# Patient Record
Sex: MALE | Race: WHITE | NOT HISPANIC OR LATINO | Employment: FULL TIME | URBAN - METROPOLITAN AREA
[De-identification: names, ages, dates, MRNs, and addresses within clinical notes are randomized per-mention and may not be internally consistent; named-entity substitution may affect disease eponyms.]

---

## 2023-12-08 ENCOUNTER — OFFICE VISIT (OUTPATIENT)
Dept: PRIMARY CARE | Facility: CLINIC | Age: 63
End: 2023-12-08
Payer: COMMERCIAL

## 2023-12-08 VITALS
HEART RATE: 91 BPM | TEMPERATURE: 98 F | HEIGHT: 67 IN | BODY MASS INDEX: 29.51 KG/M2 | OXYGEN SATURATION: 97 % | DIASTOLIC BLOOD PRESSURE: 82 MMHG | WEIGHT: 188 LBS | SYSTOLIC BLOOD PRESSURE: 142 MMHG

## 2023-12-08 DIAGNOSIS — E55.9 VITAMIN D DEFICIENCY: ICD-10-CM

## 2023-12-08 DIAGNOSIS — Z12.5 PROSTATE CANCER SCREENING: ICD-10-CM

## 2023-12-08 DIAGNOSIS — Z12.11 COLON CANCER SCREENING: ICD-10-CM

## 2023-12-08 DIAGNOSIS — E11.9 TYPE 2 DIABETES MELLITUS WITHOUT COMPLICATION, WITHOUT LONG-TERM CURRENT USE OF INSULIN (MULTI): Primary | ICD-10-CM

## 2023-12-08 LAB — POC HEMOGLOBIN A1C: 6.3 % (ref 4.2–6.5)

## 2023-12-08 PROCEDURE — 83036 HEMOGLOBIN GLYCOSYLATED A1C: CPT | Performed by: FAMILY MEDICINE

## 2023-12-08 PROCEDURE — 3077F SYST BP >= 140 MM HG: CPT | Performed by: FAMILY MEDICINE

## 2023-12-08 PROCEDURE — 1036F TOBACCO NON-USER: CPT | Performed by: FAMILY MEDICINE

## 2023-12-08 PROCEDURE — 99214 OFFICE O/P EST MOD 30 MIN: CPT | Performed by: FAMILY MEDICINE

## 2023-12-08 PROCEDURE — 3079F DIAST BP 80-89 MM HG: CPT | Performed by: FAMILY MEDICINE

## 2023-12-08 RX ORDER — METFORMIN HYDROCHLORIDE 500 MG/1
1000 TABLET ORAL
Qty: 360 TABLET | Refills: 1 | Status: SHIPPED | OUTPATIENT
Start: 2023-12-08 | End: 2024-06-04 | Stop reason: SDUPTHER

## 2023-12-08 RX ORDER — METFORMIN HYDROCHLORIDE 500 MG/1
1000 TABLET ORAL
COMMUNITY
Start: 2023-02-06 | End: 2023-12-08 | Stop reason: SDUPTHER

## 2023-12-08 RX ORDER — MULTIVIT,TX WITH IRON,MINERALS
250 TABLET, EXTENDED RELEASE ORAL
COMMUNITY
Start: 2023-08-21

## 2023-12-08 RX ORDER — ACETAMINOPHEN 500 MG
TABLET ORAL DAILY
COMMUNITY

## 2023-12-08 ASSESSMENT — PAIN SCALES - GENERAL: PAINLEVEL: 0-NO PAIN

## 2023-12-08 ASSESSMENT — PATIENT HEALTH QUESTIONNAIRE - PHQ9
2. FEELING DOWN, DEPRESSED OR HOPELESS: NOT AT ALL
1. LITTLE INTEREST OR PLEASURE IN DOING THINGS: NOT AT ALL
SUM OF ALL RESPONSES TO PHQ9 QUESTIONS 1 AND 2: 0

## 2023-12-08 ASSESSMENT — LIFESTYLE VARIABLES: TOTAL SCORE: 0

## 2023-12-08 NOTE — PROGRESS NOTES
Outpatient Visit Note    Chief Complaint   Patient presents with    New Patient Visit    Med Refill         HPI:  Meet South is a 63 y.o. male who presents the office as a new patient to establish care and for medication follow-up.    Chart review notes a past medical history significant for diabetes and vitamin D deficiency.  Was previously established with Dr. Berenice Nugent, having last been seen on 8/21/2023 for diabetic follow-up.  At that time orders were given for blood work including A1c as well as urine albumin.  No results are visible in system.    Diabetes:  Has most recently managed with max dose metformin.  Denies any adverse side effects with medication.  No reported polyuria, polydipsia, polyphasia or acute vision/sensation changes.  Patient was previously offered retina view at prior PCPs office and declined.  Denies any recent diabetic eye exam.    Has historically managed his sugars with high activity level, though he had acute aggravation to his chronic knee issues playing [employer] ball several months ago which has taken him off his activity baseline. Is hoping to get back on track. Has been seen by orthopedics regarding knees in the past to which surgery was encouraged.    Current Medications  Current Outpatient Medications   Medication Instructions    cholecalciferol (Vitamin D3) 5,000 Units tablet oral, Daily    magnesium gluconate 12.5 mg magne- sium (250 mg) tablet 250 mg, oral    metFORMIN (GLUCOPHAGE) 1,000 mg, oral, 2 times daily with meals    NON FORMULARY ( 5,000 mcg ), SL, daily, 0 Refill(s), Type: Maintenance        Allergies  No Known Allergies     Past Medical History:   Diagnosis Date    Diabetes mellitus (CMS/[facility])       History reviewed. No pertinent surgical history.  Family History   Problem Relation Name Age of Onset    Breast cancer Mother       Social History     Tobacco Use    Smoking status: Never    Smokeless tobacco: Never   Vaping Use    Vaping Use: Never used    Substance Use Topics    Alcohol use: Yes     Comment: on weekends    Drug use: Never       ROS  All pertinent positive symptoms are included in the history of present illness.  All other systems have been reviewed and are negative and noncontributory to this patient's current ailments.    VITAL SIGNS  Vitals:    12/08/23 1438   BP: 142/82   Pulse: 91   Temp: 36.7 °C (98 °F)   SpO2: 97%       PHYSICAL EXAM  GENERAL APPEARANCE: alert and oriented, Pleasant and cooperative, No Acute Distress  HEENT: EOMI, PERRLA, MMM  HEART: RRR, normal S1S2, no murmurs, click or rubs  LUNGS: clear to auscultation bilaterally, no wheezes/rhonchi/rales  EXTREMITIES: no edema, normal ROM  DM FOOT: Vascular intact, Skin intact, Monofilament sensation normal  SKIN: normal, no rash, unremarkable  NEUROLOGIC EXAM: non-focal exam  MUSCULOSKELETAL: no gross abnormalities  PSYCH: affect is normal, eye contact is good    Assessment/Plan   Problem List Items Addressed This Visit             ICD-10-CM    Diabetes mellitus, type 2 (CMS/[facility]) - Primary E11.9     - A1c sugar average performed in office today which was 6.3% which continues to show good control though above your typical average has her activity level has been off its baseline  -Continue to focus on healthy, low-fat diet along with moderation of carbohydrates  -Annual diabetic eye exam advocated         Relevant Medications    metFORMIN (Glucophage) 500 mg tablet    Other Relevant Orders    POCT glycosylated hemoglobin (Hb A1C) manually resulted    Comprehensive metabolic panel    Lipid panel    Tsh With Reflex To Free T4 If Abnormal    Vitamin D deficiency E55.9     - Will check vitamin D level secondary to history of deficiency         Relevant Orders    Vitamin D 25-Hydroxy,Total (for eval of Vitamin D levels)     Other Visit Diagnoses         Codes    Prostate cancer screening     Z12.5    Relevant Orders    Prostate Spec.Ag,Screen    Colon cancer screening     Z12.11    Relevant  Medications    metFORMIN (Glucophage) 500 mg tablet    Other Relevant Orders    Cologuard® colon cancer screening

## 2023-12-08 NOTE — PATIENT INSTRUCTIONS
Problem List Items Addressed This Visit             ICD-10-CM    Diabetes mellitus, type 2 (CMS/[facility]) - Primary E11.9     - A1c sugar average performed in office today which was 6.3% which continues to show good control though above your typical average has her activity level has been off its baseline  -Continue to focus on healthy, low-fat diet along with moderation of carbohydrates  -Annual diabetic eye exam advocated         Relevant Medications    metFORMIN (Glucophage) 500 mg tablet    Other Relevant Orders    POCT glycosylated hemoglobin (Hb A1C) manually resulted    Comprehensive metabolic panel    Lipid panel    Tsh With Reflex To Free T4 If Abnormal    Vitamin D deficiency E55.9     - Will check vitamin D level secondary to history of deficiency         Relevant Orders    Vitamin D 25-Hydroxy,Total (for eval of Vitamin D levels)     Other Visit Diagnoses         Codes    Prostate cancer screening     Z12.5    Relevant Orders    Prostate Spec.Ag,Screen    Colon cancer screening     Z12.11    Relevant Medications    metFORMIN (Glucophage) 500 mg tablet    Other Relevant Orders    Cologuard® colon cancer screening

## 2023-12-08 NOTE — ASSESSMENT & PLAN NOTE
- A1c sugar average performed in office today which was 6.3% which continues to show good control though above your typical average has her activity level has been off its baseline  -Continue to focus on healthy, low-fat diet along with moderation of carbohydrates  -Annual diabetic eye exam advocated

## 2024-01-18 DIAGNOSIS — R19.5 POSITIVE COLORECTAL CANCER SCREENING USING COLOGUARD TEST: Primary | ICD-10-CM

## 2024-01-18 LAB — NONINV COLON CA DNA+OCC BLD SCRN STL QL: POSITIVE

## 2024-06-04 ENCOUNTER — OFFICE VISIT (OUTPATIENT)
Dept: PRIMARY CARE | Facility: CLINIC | Age: 64
End: 2024-06-04
Payer: COMMERCIAL

## 2024-06-04 VITALS
HEIGHT: 67 IN | HEART RATE: 100 BPM | OXYGEN SATURATION: 98 % | DIASTOLIC BLOOD PRESSURE: 80 MMHG | TEMPERATURE: 96.8 F | BODY MASS INDEX: 28.35 KG/M2 | SYSTOLIC BLOOD PRESSURE: 140 MMHG | WEIGHT: 180.6 LBS

## 2024-06-04 DIAGNOSIS — R19.5 POSITIVE COLORECTAL CANCER SCREENING USING COLOGUARD TEST: ICD-10-CM

## 2024-06-04 DIAGNOSIS — E11.9 TYPE 2 DIABETES MELLITUS WITHOUT COMPLICATION, WITHOUT LONG-TERM CURRENT USE OF INSULIN (MULTI): Primary | ICD-10-CM

## 2024-06-04 LAB — POC HEMOGLOBIN A1C: 6.4 % (ref 4.2–6.5)

## 2024-06-04 PROCEDURE — 99213 OFFICE O/P EST LOW 20 MIN: CPT | Performed by: FAMILY MEDICINE

## 2024-06-04 PROCEDURE — 83036 HEMOGLOBIN GLYCOSYLATED A1C: CPT | Performed by: FAMILY MEDICINE

## 2024-06-04 PROCEDURE — 1036F TOBACCO NON-USER: CPT | Performed by: FAMILY MEDICINE

## 2024-06-04 PROCEDURE — 3077F SYST BP >= 140 MM HG: CPT | Performed by: FAMILY MEDICINE

## 2024-06-04 PROCEDURE — 3079F DIAST BP 80-89 MM HG: CPT | Performed by: FAMILY MEDICINE

## 2024-06-04 RX ORDER — METFORMIN HYDROCHLORIDE 500 MG/1
1000 TABLET ORAL
Qty: 360 TABLET | Refills: 3 | Status: SHIPPED | OUTPATIENT
Start: 2024-06-04 | End: 2025-06-04

## 2024-06-04 ASSESSMENT — PATIENT HEALTH QUESTIONNAIRE - PHQ9
1. LITTLE INTEREST OR PLEASURE IN DOING THINGS: NOT AT ALL
2. FEELING DOWN, DEPRESSED OR HOPELESS: NOT AT ALL
SUM OF ALL RESPONSES TO PHQ9 QUESTIONS 1 AND 2: 0

## 2024-06-04 ASSESSMENT — PAIN SCALES - GENERAL: PAINLEVEL: 1

## 2024-06-04 NOTE — PROGRESS NOTES
Outpatient Visit Note    Chief Complaint   Patient presents with    Med Refill     Metformin         HPI:  Meet South is a 63 y.o. male with  a past medical history significant for diabetes and vitamin D deficiency who presents the office for follow-up.  He was last seen in the office on 12/8/2023 as a new patient to establish care and for medication follow-up.    Was previously established with Dr. Berenice Nugent, having last been seen on 8/21/2023 for diabetic follow-up.  At that time orders were given for blood work including A1c as well as urine albumin.  No results are visible in system.    Diabetes:  Has most recently managed with max dose metformin.  A1c at last encounter showed good control at 6.3%.  Denies any adverse side effects with medication.  No reported polyuria, polydipsia, polyphasia or acute vision/sensation changes.  Patient was previously offered retina view at prior PCPs office and declined.  Denies any recent diabetic eye exam.  Orders were given for routine blood work at last encounter.  States to have blood work completed at [facility] though results were never received.    Has historically managed his sugars with high activity level, though he admitted to acute aggravation to his chronic knee issues at last encounter while playing pickle ball which had taken him off his activity baseline.  Had been seen by orthopedics regarding knees in the past to which surgery was encouraged.  In interval, he has continued to have limitations with his activity though does help to increase activity as weather has improved.  Does state that planning to move to [place] by the end of the year.    Of note, in interval patient completed Cologuard which was positive.  GI consultation for probable colonoscopy was recommended to which he did have successful colonoscopy completed in February.  Everything was reassuring with 10-year repeat recommended.    Current Medications  Current Outpatient Medications    Medication Instructions    cholecalciferol (Vitamin D3) 5,000 Units tablet oral, Daily    cyanocobalamin, vitamin B-12, (VITAMIN B-12 ORAL) oral    magnesium gluconate 12.5 mg magne- sium (250 mg) tablet 250 mg, oral    metFORMIN (GLUCOPHAGE) 1,000 mg, oral, 2 times daily (morning and late afternoon)        Allergies  No Known Allergies     Past Medical History:   Diagnosis Date    Diabetes mellitus (Multi)       History reviewed. No pertinent surgical history.  Family History   Problem Relation Name Age of Onset    Breast cancer Mother Britany      Social History     Tobacco Use    Smoking status: Never    Smokeless tobacco: Never   Vaping Use    Vaping status: Never Used   Substance Use Topics    Alcohol use: Yes     Alcohol/week: 3.0 standard drinks of alcohol     Types: 3 Standard drinks or equivalent per week     Comment: on weekends    Drug use: Never       ROS  All pertinent positive symptoms are included in the history of present illness.  All other systems have been reviewed and are negative and noncontributory to this patient's current ailments.    VITAL SIGNS  Vitals:    06/04/24 0928   BP: 140/80   Pulse: 100   Temp: 36 °C (96.8 °F)   SpO2: 98%         PHYSICAL EXAM  GENERAL APPEARANCE: alert and oriented, Pleasant and cooperative, No Acute Distress  HEENT: EOMI, PERRLA, MMM  HEART: RRR, normal S1S2, no murmurs, click or rubs  LUNGS: clear to auscultation bilaterally, no wheezes/rhonchi/rales  EXTREMITIES: no edema, normal ROM  SKIN: normal, no rash, unremarkable  NEUROLOGIC EXAM: non-focal exam  MUSCULOSKELETAL: no gross abnormalities  PSYCH: affect is normal, eye contact is good    Assessment/Plan   Problem List Items Addressed This Visit             ICD-10-CM    Diabetes mellitus, type 2 (Multi) - Primary E11.9     - A1c sugar average performed in office today which was 6.4%; this is slightly increased compared to 6.3% in December 2023 though continues to show great control  -Continue to focus on  healthy, low-fat diet along with moderation of carbohydrates  -Annual diabetic eye exam advocated  -Please contact [facility] and have them fax blood work for review         Relevant Medications    metFORMIN (Glucophage) 500 mg tablet    Other Relevant Orders    POCT glycosylated hemoglobin (Hb A1C) manually resulted (Completed)    Positive colorectal cancer screening using Cologuard test R19.5     - I am very glad that you are able to coordinate colonoscopy and that everything was reassuring with plans for repeat in 10 years          Please complete panel of fasting blood work at earliest convenience

## 2024-06-04 NOTE — PATIENT INSTRUCTIONS
Problem List Items Addressed This Visit             ICD-10-CM    Diabetes mellitus, type 2 (Multi) - Primary E11.9     - A1c sugar average performed in office today which was 6.4%; this is slightly increased compared to 6.3% in December 2023 though continues to show great control  -Continue to focus on healthy, low-fat diet along with moderation of carbohydrates  -Annual diabetic eye exam advocated  -Please contact [facility] and have them fax blood work for review         Relevant Medications    metFORMIN (Glucophage) 500 mg tablet    Other Relevant Orders    POCT glycosylated hemoglobin (Hb A1C) manually resulted    Positive colorectal cancer screening using Cologuard test R19.5     - I am very glad that you are able to coordinate colonoscopy and that everything was reassuring with plans for repeat in 10 years          Other Visit Diagnoses         Codes    Colon cancer screening     Z12.11    Relevant Medications    metFORMIN (Glucophage) 500 mg tablet          Please complete panel of fasting blood work at earliest convenience

## 2025-02-27 ENCOUNTER — OFFICE VISIT (OUTPATIENT)
Dept: PRIMARY CARE | Facility: CLINIC | Age: 65
End: 2025-02-27
Payer: COMMERCIAL

## 2025-02-27 VITALS
WEIGHT: 187 LBS | BODY MASS INDEX: 29.35 KG/M2 | DIASTOLIC BLOOD PRESSURE: 78 MMHG | SYSTOLIC BLOOD PRESSURE: 132 MMHG | OXYGEN SATURATION: 99 % | TEMPERATURE: 97.3 F | HEART RATE: 97 BPM | HEIGHT: 67 IN

## 2025-02-27 DIAGNOSIS — E11.9 TYPE 2 DIABETES MELLITUS WITHOUT COMPLICATION, WITHOUT LONG-TERM CURRENT USE OF INSULIN (MULTI): Primary | ICD-10-CM

## 2025-02-27 DIAGNOSIS — Z12.5 PROSTATE CANCER SCREENING: ICD-10-CM

## 2025-02-27 LAB — POC HEMOGLOBIN A1C: 7.2 % (ref 4.2–6.5)

## 2025-02-27 PROCEDURE — 3008F BODY MASS INDEX DOCD: CPT | Performed by: FAMILY MEDICINE

## 2025-02-27 PROCEDURE — 3075F SYST BP GE 130 - 139MM HG: CPT | Performed by: FAMILY MEDICINE

## 2025-02-27 PROCEDURE — 3078F DIAST BP <80 MM HG: CPT | Performed by: FAMILY MEDICINE

## 2025-02-27 PROCEDURE — 83036 HEMOGLOBIN GLYCOSYLATED A1C: CPT | Performed by: FAMILY MEDICINE

## 2025-02-27 PROCEDURE — 99214 OFFICE O/P EST MOD 30 MIN: CPT | Performed by: FAMILY MEDICINE

## 2025-02-27 RX ORDER — METFORMIN HYDROCHLORIDE 500 MG/1
1000 TABLET ORAL
Qty: 360 TABLET | Refills: 3 | Status: SHIPPED | OUTPATIENT
Start: 2025-02-27 | End: 2026-02-27

## 2025-02-27 ASSESSMENT — PATIENT HEALTH QUESTIONNAIRE - PHQ9
1. LITTLE INTEREST OR PLEASURE IN DOING THINGS: NOT AT ALL
SUM OF ALL RESPONSES TO PHQ9 QUESTIONS 1 AND 2: 0
2. FEELING DOWN, DEPRESSED OR HOPELESS: NOT AT ALL

## 2025-02-27 ASSESSMENT — PAIN SCALES - GENERAL: PAINLEVEL_OUTOF10: 0-NO PAIN

## 2025-02-27 NOTE — ASSESSMENT & PLAN NOTE
- A1c sugar average performed in office today which was 7.2%; this is increased compared to 6.4% in June 2024  -Sugars are elevated though I believe this is a combination of sedentary activity and diet changes related to stress  -Will continue on current regimen along with healthy, low-fat diet along with moderation of carbohydrates  -Annual diabetic eye exam advocated

## 2025-02-27 NOTE — PROGRESS NOTES
Outpatient Visit Note    Chief Complaint   Patient presents with    Diabetes         HPI:  Meet South is a 64 y.o. male with a past medical history significant for diabetes and vitamin D deficiency who presents the office for overdue follow-up.  He was last seen in the office on 24 for follow-up.    Was previously established with Dr. Berenice Nugent, having last been seen on 2023 for diabetic follow-up.     As for routine blood work previously given which have since .  [facility] faxed results have yet to be received to date.  A1c has continued to show good control with last sugar average checked in , which was 6.4%    Diabetes:  Has most recently managed with max dose metformin.  Denies any adverse side effects with medication.  No reported polyuria, polydipsia, polyphasia or acute vision/sensation changes.  Patient was previously offered retina view at prior PCPs office and declined.  Denies any recent diabetic eye exam.  Orders were given for routine blood work at last encounter.  [facility].    Expressed plans to [place] in the future they have been waiting to sell his house.  States that his physical activity level has been down during the winter and he did get rid of his gym membership in anticipation of moving.  Notes that he has been more stressed in regards to the sale of his house which has likely contributed towards poor dietary habits.    Of note, patient completed Cologuard which was positive.  GI consultation for probable colonoscopy was recommended to which he did have successful colonoscopy completed in February.  Everything was reassuring with 10-year repeat recommended.    Current Medications  Current Outpatient Medications   Medication Instructions    cholecalciferol (Vitamin D3) 5,000 Units tablet Daily    cyanocobalamin, vitamin B-12,  (VITAMIN B-12 ORAL) Take by mouth.    magnesium gluconate 12.5 mg magne- sium (250 mg) tablet 250 mg    metFORMIN (GLUCOPHAGE) 1,000 mg, oral, 2 times daily (morning and late afternoon)        Allergies  No Known Allergies     Past Medical History:   Diagnosis Date    Diabetes mellitus (Multi)       History reviewed. No pertinent surgical history.  Family History   Problem Relation Name Age of Onset    Breast cancer Mother Britany      Social History     Tobacco Use    Smoking status: Never    Smokeless tobacco: Never   Vaping Use    Vaping status: Never Used   Substance Use Topics    Alcohol use: Yes     Alcohol/week: 3.0 standard drinks of alcohol     Types: 3 Standard drinks or equivalent per week     Comment: on weekends    Drug use: Never       ROS  All pertinent positive symptoms are included in the history of present illness.  All other systems have been reviewed and are negative and noncontributory to this patient's current ailments.    VITAL SIGNS  Vitals:    02/27/25 0909   BP: 132/78   Pulse: 97   Temp: 36.3 °C (97.3 °F)   SpO2: 99%       PHYSICAL EXAM  GENERAL APPEARANCE: alert and oriented, Pleasant and cooperative, No Acute Distress  HEENT: EOMI, PERRLA, MMM  HEART: RRR, normal S1S2, no murmurs, click or rubs  LUNGS: clear to auscultation bilaterally, no wheezes/rhonchi/rales  EXTREMITIES: no edema, normal ROM  SKIN: normal, no rash, unremarkable  NEUROLOGIC EXAM: non-focal exam  MUSCULOSKELETAL: no gross abnormalities  PSYCH: affect is normal, eye contact is good    Assessment/Plan   Problem List Items Addressed This Visit             ICD-10-CM    Diabetes mellitus, type 2 (Multi) - Primary E11.9     - A1c sugar average performed in office today which was 7.2%; this is increased compared to 6.4% in June 2024  -Sugars are elevated though I believe this is a combination of sedentary activity and diet changes related to stress  -Will continue on current regimen along with healthy, low-fat diet along with  moderation of carbohydrates  -Annual diabetic eye exam advocated         Relevant Medications    metFORMIN (Glucophage) 500 mg tablet    Other Relevant Orders    POCT glycosylated hemoglobin (Hb A1C) manually resulted (Completed)    Comprehensive metabolic panel    Lipid panel     Other Visit Diagnoses         Codes    Prostate cancer screening     Z12.5    Relevant Orders    Prostate Spec.Ag,Screen          Please complete panel of fasting blood work at earliest convenience

## 2025-02-27 NOTE — PATIENT INSTRUCTIONS
Problem List Items Addressed This Visit             ICD-10-CM    Diabetes mellitus, type 2 (Multi) - Primary E11.9     - A1c sugar average performed in office today which was 7.2%; this is increased compared to 6.4% in June 2024  -Sugars are elevated though I believe this is a combination of sedentary activity and diet changes related to stress  -Will continue on current regimen along with healthy, low-fat diet along with moderation of carbohydrates  -Annual diabetic eye exam advocated         Relevant Medications    metFORMIN (Glucophage) 500 mg tablet    Other Relevant Orders    POCT glycosylated hemoglobin (Hb A1C) manually resulted (Completed)    Comprehensive metabolic panel    Lipid panel     Other Visit Diagnoses         Codes    Prostate cancer screening     Z12.5    Relevant Orders    Prostate Spec.Ag,Screen          Please complete panel of fasting blood work at earliest convenience
